# Patient Record
Sex: MALE | Race: WHITE
[De-identification: names, ages, dates, MRNs, and addresses within clinical notes are randomized per-mention and may not be internally consistent; named-entity substitution may affect disease eponyms.]

---

## 2018-07-03 ENCOUNTER — HOSPITAL ENCOUNTER (EMERGENCY)
Dept: HOSPITAL 47 - EC | Age: 23
Discharge: HOME | End: 2018-07-03
Payer: COMMERCIAL

## 2018-07-03 VITALS
HEART RATE: 90 BPM | SYSTOLIC BLOOD PRESSURE: 117 MMHG | RESPIRATION RATE: 16 BRPM | TEMPERATURE: 97.9 F | DIASTOLIC BLOOD PRESSURE: 76 MMHG

## 2018-07-03 DIAGNOSIS — R10.32: Primary | ICD-10-CM

## 2018-07-03 DIAGNOSIS — R19.7: ICD-10-CM

## 2018-07-03 DIAGNOSIS — Z79.899: ICD-10-CM

## 2018-07-03 DIAGNOSIS — R11.0: ICD-10-CM

## 2018-07-03 LAB
ALBUMIN SERPL-MCNC: 4.7 G/DL (ref 3.5–5)
ALP SERPL-CCNC: 66 U/L (ref 38–126)
ALT SERPL-CCNC: 43 U/L (ref 21–72)
AMYLASE SERPL-CCNC: 53 U/L (ref 30–110)
ANION GAP SERPL CALC-SCNC: 15 MMOL/L
AST SERPL-CCNC: 31 U/L (ref 17–59)
BASOPHILS # BLD AUTO: 0 K/UL (ref 0–0.2)
BASOPHILS NFR BLD AUTO: 0 %
BUN SERPL-SCNC: 21 MG/DL (ref 9–20)
CALCIUM SPEC-MCNC: 9.9 MG/DL (ref 8.4–10.2)
CHLORIDE SERPL-SCNC: 104 MMOL/L (ref 98–107)
CO2 SERPL-SCNC: 23 MMOL/L (ref 22–30)
EOSINOPHIL # BLD AUTO: 0.5 K/UL (ref 0–0.7)
EOSINOPHIL NFR BLD AUTO: 6 %
ERYTHROCYTE [DISTWIDTH] IN BLOOD BY AUTOMATED COUNT: 5.4 M/UL (ref 4.3–5.9)
ERYTHROCYTE [DISTWIDTH] IN BLOOD: 12.5 % (ref 11.5–15.5)
GLUCOSE SERPL-MCNC: 59 MG/DL (ref 74–99)
HCT VFR BLD AUTO: 46.4 % (ref 39–53)
HGB BLD-MCNC: 16.5 GM/DL (ref 13–17.5)
KETONES UR QL STRIP.AUTO: (no result)
LIPASE SERPL-CCNC: 124 U/L (ref 23–300)
LYMPHOCYTES # SPEC AUTO: 1.5 K/UL (ref 1–4.8)
LYMPHOCYTES NFR SPEC AUTO: 19 %
MCH RBC QN AUTO: 30.6 PG (ref 25–35)
MCHC RBC AUTO-ENTMCNC: 35.6 G/DL (ref 31–37)
MCV RBC AUTO: 85.8 FL (ref 80–100)
MONOCYTES # BLD AUTO: 0.3 K/UL (ref 0–1)
MONOCYTES NFR BLD AUTO: 4 %
NEUTROPHILS # BLD AUTO: 5.5 K/UL (ref 1.3–7.7)
NEUTROPHILS NFR BLD AUTO: 69 %
PH UR: 5.5 [PH] (ref 5–8)
PLATELET # BLD AUTO: 183 K/UL (ref 150–450)
POTASSIUM SERPL-SCNC: 4.3 MMOL/L (ref 3.5–5.1)
PROT SERPL-MCNC: 7.1 G/DL (ref 6.3–8.2)
SODIUM SERPL-SCNC: 142 MMOL/L (ref 137–145)
SP GR UR: 1.02 (ref 1–1.03)
UROBILINOGEN UR QL STRIP: <2 MG/DL (ref ?–2)
WBC # BLD AUTO: 8.1 K/UL (ref 3.8–10.6)

## 2018-07-03 PROCEDURE — 83690 ASSAY OF LIPASE: CPT

## 2018-07-03 PROCEDURE — 74177 CT ABD & PELVIS W/CONTRAST: CPT

## 2018-07-03 PROCEDURE — 81003 URINALYSIS AUTO W/O SCOPE: CPT

## 2018-07-03 PROCEDURE — 80053 COMPREHEN METABOLIC PANEL: CPT

## 2018-07-03 PROCEDURE — 82150 ASSAY OF AMYLASE: CPT

## 2018-07-03 PROCEDURE — 99284 EMERGENCY DEPT VISIT MOD MDM: CPT

## 2018-07-03 PROCEDURE — 36415 COLL VENOUS BLD VENIPUNCTURE: CPT

## 2018-07-03 PROCEDURE — 85025 COMPLETE CBC W/AUTO DIFF WBC: CPT

## 2018-07-03 NOTE — CT
EXAMINATION TYPE: CT abdomen pelvis w con

 

DATE OF EXAM: 7/3/2018

 

COMPARISON: None

 

HISTORY: Pelvic pain with diarrhea and chills

 

CT DLP: 1069 mGycm

Automated exposure control for dose reduction was used.

 

TECHNIQUE:  Helical acquisition of images was performed from the lung bases through the pelvis.

 

CONTRAST: 

Performed without Oral Contrast and with IV Contrast, patient injected with 100 mL of Isovue 300.

 

FINDINGS: 

 

LUNG BASES: No significant abnormality is appreciated.

 

LIVER/GB: No significant abnormality is appreciated. No cholelithiasis.

 

PANCREAS: No significant abnormality is seen. No ductal dilatation.

 

SPLEEN: No significant abnormality is seen. No splenomegaly.

 

ADRENALS: No significant abnormality is seen. No thickening or nodularity.

 

KIDNEYS: No significant abnormality is seen. No hydronephrosis.

 

FREE AIR:  No free air is visualized.

 

ADENOPATHY:  No greater than 1 cm short axis lymph node within the abdomen or pelvis. Clustered promi
nent lymph nodes within the right lower quadrant are described below in the bowel section.

 

REPRODUCTIVE ORGANS: No significant abnormality is seen

 

URINARY BLADDER:  No significant abnormality is seen.

 

OSSEOUS STRUCTURES:  No significant abnormality is seen.

 

BOWEL:  The appendix is minimally enlarged measuring 8 mm on coronal series 5 image 24. Additionally 
there is appendiceal wall hyperemia such as on series 3 image 51. Multiple prominent clustered periap
pendiceal lymph nodes are also noted. No significant periappendiceal fat stranding is yet seen. No pe
riappendiceal fluid collection. No dilated large or small bowel to suggest obstruction.

 

IMPRESSION: 

FINDINGS CONCERNING FOR EARLY DEVELOPING ACUTE APPENDICITIS AS THE APPENDIX IS MINIMALLY ENLARGED (7 
MM) AND THERE ARE MULTIPLE RIGHT LOWER QUADRANT PROMINENT LYMPH NODES. HOWEVER THERE IS NO SIGNIFICAN
T PERIAPPENDICEAL FAT STRANDING AT THIS TIME OR PERIAPPENDICEAL FLUID COLLECTION. IF CLINICAL FINDING
S ARE CORTICAL FOR ACUTE APPENDICITIS SHORT-TERM FOLLOW-UP COULD BE PERFORMED WITH ADMINISTRATION OF 
ORAL CONTRAST.

## 2018-07-03 NOTE — ED
General Adult HPI





- General


Chief complaint: Abdominal Pain


Stated complaint: abd pain sent by PCP


Time Seen by Provider: 07/03/18 12:54


Source: patient, family, RN notes reviewed


Mode of arrival: ambulatory


Limitations: no limitations





- History of Present Illness


Initial comments: 





Patient 22-year-old male presented to the emergency room today with a chief 

complaint of left-sided abdominal pain 10 days.  He does admit to cramping 

type pain it's been coming and going.  Patient does admit to diarrhea.  He 

admits to 3-8 episodes of diarrhea per day.  Did see his family doctor 

yesterday who was going to order a CT of the abdomen and pelvis.  Patient 

states pain increased so we decided to come here to the emergency room to be 

seen.  Patient denies any travel.  He denies any other sick contacts at home.  

Admits to feeling nauseated but no vomiting. Patient denies any recent fever, 

chills, shortness of breath, chest pain, numbness or tingling, dysuria or 

hematuria, constipation, headaches or visual changes, or any other complaints.





- Related Data


 Home Medications











 Medication  Instructions  Recorded  Confirmed


 


Citalopram Hydrobromide [CeleXA] 20 mg PO DAILY 07/03/18 07/03/18


 


Polyethylene Glycol 3350 [Miralax] 17 gm PO DAILY PRN 07/03/18 07/03/18











 Allergies











Allergy/AdvReac Type Severity Reaction Status Date / Time


 


No Known Allergies Allergy   Verified 07/03/18 12:56














Review of Systems


ROS Statement: 


Those systems with pertinent positive or pertinent negative responses have been 

documented in the HPI.





ROS Other: All systems not noted in ROS Statement are negative.





Past Medical History


Past Medical History: No Reported History


History of Any Multi-Drug Resistant Organisms: None Reported


Past Surgical History: No Surgical Hx Reported


Past Psychological History: No Psychological Hx Reported


Smoking Status: Never smoker


Past Alcohol Use History: None Reported


Past Drug Use History: None Reported





General Exam





- General Exam Comments


Initial Comments: 





General:  The patient is awake and alert, in no distress, and does not appear 

acutely ill. 


Eye:  Pupils are equal, round and reactive to light, extra-ocular movements are 

intact.  No nystagmus.  There is normal conjunctiva bilaterally.  No signs of 

icterus.  


Ears, nose, mouth and throat:  There are moist mucous membranes and no oral 

lesions. 


Neck:  The neck is supple, there is no tenderness or JVD.  


Cardiovascular:  There is a regular rate and rhythm. No murmur, rub or gallop 

is appreciated.


Respiratory:  Lungs are clear to auscultation, respirations are non-labored, 

breath sounds are equal.  No wheezes, stridor, rales, or rhonchi.


Gastrointestinal: Mild tenderness in left lower quadrant.  No rebound 

tenderness.  No guarding.


Musculoskeletal:  Normal ROM, no tenderness.  Strength 5/5. Sensation intact. 

Pulses equal bilaterally 2+.  


Neurological:  A&O x 3. CN II-XII intact, There are no obvious motor or sensory 

deficits. Coordination appears grossly intact. Speech is normal.


Skin:  Skin is warm and dry and no rashes or lesions are noted. 


Psychiatric:  Cooperative, appropriate mood & affect, normal judgment.  


Limitations: no limitations





Course


 Vital Signs











  07/03/18





  12:25


 


Temperature 97.9 F


 


Pulse Rate 90


 


Respiratory 16





Rate 


 


Blood Pressure 117/76


 


O2 Sat by Pulse 100





Oximetry 














Medical Decision Making





- Medical Decision Making





Patient reexamined at this time shows no signs of distress.  He is resting 

comfortably.  Patient's labs been reviewed no elevated white count.  Patient is 

a fever.  Patient states symptoms are been present for 10 days with diarrhea.  

There is no travel.  No recent antibiotic use.  Patient unable to provide a 

stool sample here in the emergency room.  Patient has no pain right lower 

quadrant.  Patient states pain is located on the left side.  Patient's CT 

initial report showed evidence for possible early appendicitis.Please use 

medication as discussed.  Please follow-up with family doctor in the next 2 

days of symptoms have not improved.  Please return to emergency room if the 

symptoms increase or worsen or for any other concerns.  Case was discussed with 

attending physician Dr. Varma did discuss the case with radiologist Dr. Castro 

states does not believe evidence for appendicitis at this time.  Results were 

discussed patient and family at bedside.  Signs and symptoms of appendicitis 

were discussed in detail.  Advised that if there is any way lower quadrant pain 

should return to emergency room or any fever.  Advised follow family doctor in 

the next 1-2 days return here to emergency room if any symptoms increase or 

worsen.  At this time patient feels comfortable being discharged all questions 

have been answered.





- Lab Data


Result diagrams: 


 07/03/18 13:11





 07/03/18 13:11


 Lab Results











  07/03/18 07/03/18 07/03/18 Range/Units





  13:11 13:11 Unknown 


 


WBC  8.1    (3.8-10.6)  k/uL


 


RBC  5.40    (4.30-5.90)  m/uL


 


Hgb  16.5    (13.0-17.5)  gm/dL


 


Hct  46.4    (39.0-53.0)  %


 


MCV  85.8    (80.0-100.0)  fL


 


MCH  30.6    (25.0-35.0)  pg


 


MCHC  35.6    (31.0-37.0)  g/dL


 


RDW  12.5    (11.5-15.5)  %


 


Plt Count  183    (150-450)  k/uL


 


Neutrophils %  69    %


 


Lymphocytes %  19    %


 


Monocytes %  4    %


 


Eosinophils %  6    %


 


Basophils %  0    %


 


Neutrophils #  5.5    (1.3-7.7)  k/uL


 


Lymphocytes #  1.5    (1.0-4.8)  k/uL


 


Monocytes #  0.3    (0-1.0)  k/uL


 


Eosinophils #  0.5    (0-0.7)  k/uL


 


Basophils #  0.0    (0-0.2)  k/uL


 


Sodium   142   (137-145)  mmol/L


 


Potassium   4.3   (3.5-5.1)  mmol/L


 


Chloride   104   ()  mmol/L


 


Carbon Dioxide   23   (22-30)  mmol/L


 


Anion Gap   15   mmol/L


 


BUN   21 H   (9-20)  mg/dL


 


Creatinine   1.00   (0.66-1.25)  mg/dL


 


Est GFR (CKD-EPI)AfAm   >90   (>60 ml/min/1.73 sqM)  


 


Est GFR (CKD-EPI)NonAf   >90   (>60 ml/min/1.73 sqM)  


 


Glucose   59 L   (74-99)  mg/dL


 


Calcium   9.9   (8.4-10.2)  mg/dL


 


Total Bilirubin   1.1   (0.2-1.3)  mg/dL


 


AST   31   (17-59)  U/L


 


ALT   43   (21-72)  U/L


 


Alkaline Phosphatase   66   ()  U/L


 


Total Protein   7.1   (6.3-8.2)  g/dL


 


Albumin   4.7   (3.5-5.0)  g/dL


 


Amylase   53   ()  U/L


 


Lipase   124   ()  U/L


 


Urine Color    Yellow  


 


Urine Appearance    Clear  (Clear)  


 


Urine pH    5.5  (5.0-8.0)  


 


Ur Specific Gravity    1.023  (1.001-1.035)  


 


Urine Protein    Trace H  (Negative)  


 


Urine Glucose (UA)    Negative  (Negative)  


 


Urine Ketones    2+ H  (Negative)  


 


Urine Blood    Negative  (Negative)  


 


Urine Nitrite    Negative  (Negative)  


 


Urine Bilirubin    Negative  (Negative)  


 


Urine Urobilinogen    <2.0  (<2.0)  mg/dL


 


Ur Leukocyte Esterase    Negative  (Negative)  














Disposition


Clinical Impression: 


 Abdominal pain





Disposition: HOME SELF-CARE


Condition: Good


Instructions:  Abdominal Pain (ED)


Additional Instructions: 


Please follow-up with the family doctor in the next 1-2 days.  Please have 

stool sample brought here to the lab.  Please return to emergency room 

physician fever, increased worsening of symptoms or any other concerns as 

discussed.


Is patient prescribed a controlled substance at d/c from ED?: No


Referrals: 


Kenji Winkler DO [Primary Care Provider] - 1-2 days


Time of Disposition: 15:45

## 2018-07-09 ENCOUNTER — HOSPITAL ENCOUNTER (OUTPATIENT)
Dept: HOSPITAL 47 - LABWHC1 | Age: 23
Discharge: HOME | End: 2018-07-09
Payer: COMMERCIAL

## 2018-07-09 DIAGNOSIS — R19.7: ICD-10-CM

## 2018-07-09 DIAGNOSIS — R10.84: Primary | ICD-10-CM

## 2018-07-09 LAB
ALBUMIN SERPL-MCNC: 4.5 G/DL (ref 3.5–5)
ALP SERPL-CCNC: 55 U/L (ref 38–126)
ALT SERPL-CCNC: 35 U/L (ref 21–72)
ANION GAP SERPL CALC-SCNC: 10 MMOL/L
AST SERPL-CCNC: 24 U/L (ref 17–59)
BASOPHILS # BLD AUTO: 0 K/UL (ref 0–0.2)
BASOPHILS NFR BLD AUTO: 0 %
BUN SERPL-SCNC: 12 MG/DL (ref 9–20)
CALCIUM SPEC-MCNC: 9.6 MG/DL (ref 8.4–10.2)
CHLORIDE SERPL-SCNC: 102 MMOL/L (ref 98–107)
CO2 SERPL-SCNC: 30 MMOL/L (ref 22–30)
EOSINOPHIL # BLD AUTO: 1.1 K/UL (ref 0–0.7)
EOSINOPHIL NFR BLD AUTO: 16 %
ERYTHROCYTE [DISTWIDTH] IN BLOOD BY AUTOMATED COUNT: 5.3 M/UL (ref 4.3–5.9)
ERYTHROCYTE [DISTWIDTH] IN BLOOD: 12.5 % (ref 11.5–15.5)
GLUCOSE SERPL-MCNC: 77 MG/DL (ref 74–99)
HCT VFR BLD AUTO: 45.9 % (ref 39–53)
HGB BLD-MCNC: 15.3 GM/DL (ref 13–17.5)
LYMPHOCYTES # SPEC AUTO: 1.5 K/UL (ref 1–4.8)
LYMPHOCYTES NFR SPEC AUTO: 22 %
MCH RBC QN AUTO: 28.9 PG (ref 25–35)
MCHC RBC AUTO-ENTMCNC: 33.4 G/DL (ref 31–37)
MCV RBC AUTO: 86.5 FL (ref 80–100)
MONOCYTES # BLD AUTO: 0.3 K/UL (ref 0–1)
MONOCYTES NFR BLD AUTO: 4 %
NEUTROPHILS # BLD AUTO: 3.8 K/UL (ref 1.3–7.7)
NEUTROPHILS NFR BLD AUTO: 57 %
PLATELET # BLD AUTO: 185 K/UL (ref 150–450)
POTASSIUM SERPL-SCNC: 4.1 MMOL/L (ref 3.5–5.1)
PROT SERPL-MCNC: 6.9 G/DL (ref 6.3–8.2)
SODIUM SERPL-SCNC: 142 MMOL/L (ref 137–145)
WBC # BLD AUTO: 6.7 K/UL (ref 3.8–10.6)

## 2018-07-09 PROCEDURE — 85652 RBC SED RATE AUTOMATED: CPT

## 2018-07-09 PROCEDURE — 86038 ANTINUCLEAR ANTIBODIES: CPT

## 2018-07-09 PROCEDURE — 85025 COMPLETE CBC W/AUTO DIFF WBC: CPT

## 2018-07-09 PROCEDURE — 82607 VITAMIN B-12: CPT

## 2018-07-09 PROCEDURE — 36415 COLL VENOUS BLD VENIPUNCTURE: CPT

## 2018-07-09 PROCEDURE — 86140 C-REACTIVE PROTEIN: CPT

## 2018-07-09 PROCEDURE — 82306 VITAMIN D 25 HYDROXY: CPT

## 2018-07-09 PROCEDURE — 80053 COMPREHEN METABOLIC PANEL: CPT

## 2018-07-10 LAB — VIT B12 SERPL-MCNC: 408 PG/ML (ref 200–944)

## 2019-03-01 ENCOUNTER — HOSPITAL ENCOUNTER (OUTPATIENT)
Dept: HOSPITAL 47 - ORWHC2ENDO | Age: 24
Discharge: HOME | End: 2019-03-01
Attending: INTERNAL MEDICINE
Payer: COMMERCIAL

## 2019-03-01 VITALS — HEART RATE: 64 BPM | SYSTOLIC BLOOD PRESSURE: 105 MMHG | DIASTOLIC BLOOD PRESSURE: 67 MMHG

## 2019-03-01 VITALS — RESPIRATION RATE: 16 BRPM

## 2019-03-01 VITALS — TEMPERATURE: 97.5 F

## 2019-03-01 VITALS — BODY MASS INDEX: 22.8 KG/M2

## 2019-03-01 DIAGNOSIS — K52.832: Primary | ICD-10-CM

## 2019-03-01 DIAGNOSIS — Z79.899: ICD-10-CM

## 2019-03-01 DIAGNOSIS — F41.9: ICD-10-CM

## 2019-03-01 PROCEDURE — 45380 COLONOSCOPY AND BIOPSY: CPT

## 2019-03-01 PROCEDURE — 88305 TISSUE EXAM BY PATHOLOGIST: CPT

## 2019-03-01 NOTE — P.PCN
Date of Procedure: 03/01/19


Procedure(s) Performed: 


BRIEF HISTORY: Patient is a 23-year-old pleasant white male, scheduled for an 

elective colonoscopy as a part of evaluation of intermittent lower abdominal 

cramping pain/diarrhea on-and-off for the last 7 months duration.  He scheduled 

for colonoscopy to rule out inflammatory bowel disease.








PROCEDURE PERFORMED: Colonoscopy with random biopsies. 





PREOPERATIVE DIAGNOSIS: Lower abdominal pain and intermittent diarrhea of 7 

months duration. 





IV sedation per Anesthesia. 





PROCEDURE: After informed consent was obtained, the patient, was brought into 

the endoscopy unit. IV sedation was administered by Anesthesia under continuous 

monitoring.  Digital rectal examination was normal. Initially the Olympus CF-

160 flexible video colonoscope was then inserted in the rectum, gradually 

advanced into the cecum without any difficulty. Careful examination was 

performed as the scope was gradually being withdrawn. Ileocecal valve and the 

appendiceal orifice were visualized and appeared normal.  Prep was excellent.  

Terminal ileum was intubated and 20 cm visualized and appeared normal.  Random 

biopsies were done from the terminal ileum.  Mucosa of the cecum, ascending 

colon, transverse colon, descending colon, sigmoid colon, and rectum appeared 

normal. Retroflexion was performed in the rectum and no lesions were seen.  

Random biopsies were done from ascending and descending colon to rule out 

microscopic/collagenous colitis The patient tolerated the procedure well. 





IMPRESSION: 


Normal-appearing colon from rectum to cecum with no evidence of colitis or 

colorectal neoplasia


Normal-appearing terminal ileum


.





RECOMMENDATIONS:  Findings of this examination were discussed with the patient 

as well as his family.  He was advised to follow with the biopsy results.  He 

will continue with Bentyl as needed and continue with diet modification.

## 2019-04-17 ENCOUNTER — HOSPITAL ENCOUNTER (OUTPATIENT)
Dept: HOSPITAL 47 - LABWHC1 | Age: 24
Discharge: HOME | End: 2019-04-17
Attending: INTERNAL MEDICINE
Payer: COMMERCIAL

## 2019-04-17 DIAGNOSIS — K52.832: Primary | ICD-10-CM

## 2019-04-17 LAB — GLIADIN IGA SER-ACNC: <0.2 U/ML

## 2019-04-17 PROCEDURE — 83516 IMMUNOASSAY NONANTIBODY: CPT

## 2019-04-17 PROCEDURE — 36415 COLL VENOUS BLD VENIPUNCTURE: CPT
